# Patient Record
Sex: MALE | Race: WHITE | NOT HISPANIC OR LATINO | Employment: OTHER | URBAN - METROPOLITAN AREA
[De-identification: names, ages, dates, MRNs, and addresses within clinical notes are randomized per-mention and may not be internally consistent; named-entity substitution may affect disease eponyms.]

---

## 2020-08-04 ENCOUNTER — OFFICE VISIT (OUTPATIENT)
Dept: PODIATRY | Facility: CLINIC | Age: 55
End: 2020-08-04

## 2020-08-04 VITALS
HEIGHT: 74 IN | WEIGHT: 241 LBS | DIASTOLIC BLOOD PRESSURE: 92 MMHG | BODY MASS INDEX: 30.93 KG/M2 | HEART RATE: 80 BPM | SYSTOLIC BLOOD PRESSURE: 142 MMHG

## 2020-08-04 DIAGNOSIS — M79.672 LEFT FOOT PAIN: Primary | ICD-10-CM

## 2020-08-04 DIAGNOSIS — M77.42 METATARSALGIA, LEFT FOOT: ICD-10-CM

## 2020-08-04 DIAGNOSIS — M72.2 PLANTAR FASCIAL FIBROMATOSIS OF LEFT FOOT: ICD-10-CM

## 2020-08-04 PROCEDURE — 99203 OFFICE O/P NEW LOW 30 MIN: CPT | Performed by: PODIATRIST

## 2020-08-04 RX ORDER — ASPIRIN 81 MG/1
TABLET, CHEWABLE ORAL
COMMUNITY
Start: 2018-05-16

## 2020-08-04 RX ORDER — B-COMPLEX WITH VITAMIN C
TABLET ORAL
COMMUNITY

## 2020-08-04 RX ORDER — ERGOCALCIFEROL (VITAMIN D2) 10 MCG
400 TABLET ORAL DAILY
COMMUNITY

## 2020-08-04 RX ORDER — AMINO ACIDS/CHROMIUM
TABLET ORAL
COMMUNITY

## 2020-08-05 NOTE — PROGRESS NOTES
2020  Foot and Ankle Surgery - New Patient   Provider: Dr. Sarbjit Contreras DPM  Location: [facility]    Subjective:  Rose Pardo is a 55 y.o. male.     Chief Complaint   Patient presents with   • Left Foot - Pain       HPI: Patient is a 55-year-old male that presents with 2-month history of left foot pain.  He localizes the majority of the discomfort to the plantar aspect of the arch and forefoot.  Symptoms are worse with increased activity and typically improved with rest.  He states that symptoms are an 8 out of 10 when noticed.  He denies any injury.  He has had similar but milder issues in the past which did improve with different shoes.  He has been recently diagnosed with Dupuytren's contracture of his hand.  He has noticed a nodule to the plantar aspect of his left foot that he feels is a plantar fibroma.  No other issues at this time.    No Known Allergies    History reviewed. No pertinent past medical history.    Past Surgical History:   Procedure Laterality Date   • WRIST SURGERY Left        Family History   Problem Relation Age of Onset   • Diabetes Mother    • Heart disease Mother    • Heart disease Father    • Hypertension Father    • Hypertension Sister    • Hypertension Brother        Social History     Socioeconomic History   • Marital status: Single     Spouse name: Not on file   • Number of children: Not on file   • Years of education: Not on file   • Highest education level: Not on file   Tobacco Use   • Smoking status: Former Smoker     Last attempt to quit: 2012     Years since quittin.6   • Smokeless tobacco: Never Used   Substance and Sexual Activity   • Alcohol use: Not Currently   • Drug use: Defer   • Sexual activity: Defer        Current Outpatient Medications on File Prior to Visit   Medication Sig Dispense Refill   • aspirin 81 MG chewable tablet Chew.     • Chromium 1000 MCG tablet Take  by mouth.     • Vitamin D, Cholecalciferol, (CHOLECALCIFEROL) 10 MCG (400  "UNIT) tablet Take 400 Units by mouth Daily.     • Zinc 100 MG tablet Take  by mouth.       No current facility-administered medications on file prior to visit.        Review of Systems:  General: Denies fever, chills, fatigue, and weakness.  Eyes: Denies vision loss, blurry vision, and excessive redness.  ENT: Denies hearing issues and difficulty swallowing.  Cardiovascular: Denies palpitations, chest pain, or syncopal episodes.  Respiratory: Denies shortness of breath, wheezing, and coughing.  GI: Denies abdominal pain, nausea, and vomiting.   : Denies frequency, hematuria, and urgency.  Musculoskeletal: + Left foot pain  Derm: Denies rash, open wounds, or suspicious lesions.  Neuro: Denies headaches, numbness, loss of coordination, and tremors.  Psych: Denies anxiety and depression.  Endocrine: Denies temperature intolerance and changes in appetite.  Heme: Denies bleeding disorders or abnormal bruising.     Objective   /92   Pulse 80   Ht 188 cm (74\")   Wt 109 kg (241 lb)   BMI 30.94 kg/m²     Foot/Ankle Exam:       General:   Appearance: appears stated age and healthy    Orientation: AAOx3    Affect: appropriate    Gait: unimpaired      VASCULAR      Left Foot Vascularity   Normal vascular exam    Dorsalis pedis:  2+  Posterior tibial:  2+  Skin Temperature: warm    Edema Grading:  None  CFT:  < 3 seconds  Pedal Hair Growth:  Present  Varicosities: none        NEUROLOGIC     Left Foot Neurologic   Light touch sensation:  Normal  Hot/cold sensation: normal    Achilles reflex:  2+     MUSCULOSKELETAL      Left Foot Musculoskeletal   Ecchymosis:  None  Tenderness: lesser metatarsophalangeal joints, plantar fascia and arch    Arch:  Normal     MUSCLE STRENGTH     Left Foot Muscle Strength   Normal strength    Foot dorsiflexion:  5  Foot plantar flexion:  5  Foot inversion:  5  Foot eversion:  5     DERMATOLOGIC     Left Foot Dermatologic   Skin: skin intact        Left Foot Additional Comments: Moderate " sized nodule noted to the medial band of the plantar fascia at the level of the midfoot.  No gross deformity or instability.  Mild soft tissue rigidity and equinus contracture.      Assessment/Plan   Rose was seen today for pain.    Diagnoses and all orders for this visit:    Left foot pain  -     Cancel: XR Foot 3+ View Left    Plantar fascial fibromatosis of left foot    Metatarsalgia, left foot      Patient presents with issues involving his left foot.  He has noticed pain over the last 2 months to the plantar aspect of the arch and forefoot region.  Clinically, he does have plantar nodule to the medial band of the plantar fascia consistent with a plantar fibroma.  I explained that his symptoms are likely due to lack of support and increased forefoot stress.  We did discuss the correlation between plantar fibroma and Dupuytren's contracture.  I have recommended that he acquire a pair of over-the-counter arch supports with a metatarsal pad for forefoot offloading.  We also reviewed proper stretching and manual therapy exercises.  I would like him to monitor and I will see him in 4 weeks for reevaluation and imaging    No orders of the defined types were placed in this encounter.       Note is dictated utilizing voice recognition software. Unfortunately this leads to occasional typographical errors. I apologize in advance if the situation occurs. If questions occur please do not hesitate to call our office.

## 2020-08-05 NOTE — PATIENT INSTRUCTIONS
Foot Pain  Many things can cause foot pain. Some common causes are:  · An injury.  · A sprain.  · Arthritis.  · Blisters.  · Bunions.  Follow these instructions at home:  Managing pain, stiffness, and swelling  If directed, put ice on the painful area:  · Put ice in a plastic bag.  · Place a towel between your skin and the bag.  · Leave the ice on for 20 minutes, 2-3 times a day.    Activity  · Do not stand or walk for long periods.  · Return to your normal activities as told by your health care provider. Ask your health care provider what activities are safe for you.  · Do stretches to relieve foot pain and stiffness as told by your health care provider.  · Do not lift anything that is heavier than 10 lb (4.5 kg), or the limit that you are told, until your health care provider says that it is safe. Lifting a lot of weight can put added pressure on your feet.  Lifestyle  · Wear comfortable, supportive shoes that fit you well. Do not wear high heels.  · Keep your feet clean and dry.  General instructions  · Take over-the-counter and prescription medicines only as told by your health care provider.  · Rub your foot gently.  · Pay attention to any changes in your symptoms.  · Keep all follow-up visits as told by your health care provider. This is important.  Contact a health care provider if:  · Your pain does not get better after a few days of self-care.  · Your pain gets worse.  · You cannot stand on your foot.  Get help right away if:  · Your foot is numb or tingling.  · Your foot or toes are swollen.  · Your foot or toes turn white or blue.  · You have warmth and redness along your foot.  Summary  · Common causes of foot pain are injury, sprain, arthritis, blisters or bunions.  · Ice, medicines, and comfortable shoes may help foot pain.  · Contact your health care provider if your pain does not get better after a few days of self-care.  This information is not intended to replace advice given to you by your health  care provider. Make sure you discuss any questions you have with your health care provider.  Document Released: 01/13/2017 Document Revised: 10/03/2019 Document Reviewed: 10/03/2019  [employer]vier Patient Education © 2020 [employer]

## 2020-09-01 ENCOUNTER — OFFICE VISIT (OUTPATIENT)
Dept: PODIATRY | Facility: CLINIC | Age: 55
End: 2020-09-01

## 2020-09-01 VITALS
DIASTOLIC BLOOD PRESSURE: 87 MMHG | WEIGHT: 240 LBS | HEIGHT: 74 IN | HEART RATE: 73 BPM | BODY MASS INDEX: 30.8 KG/M2 | SYSTOLIC BLOOD PRESSURE: 137 MMHG

## 2020-09-01 DIAGNOSIS — M72.2 PLANTAR FASCIAL FIBROMATOSIS OF LEFT FOOT: ICD-10-CM

## 2020-09-01 DIAGNOSIS — M79.672 FOOT PAIN, LEFT: Primary | ICD-10-CM

## 2020-09-01 DIAGNOSIS — M77.42 METATARSALGIA, LEFT FOOT: ICD-10-CM

## 2020-09-01 PROCEDURE — 99213 OFFICE O/P EST LOW 20 MIN: CPT | Performed by: PODIATRIST

## 2022-09-12 ENCOUNTER — TELEPHONE (OUTPATIENT)
Dept: PODIATRY | Facility: CLINIC | Age: 57
End: 2022-09-12

## 2022-09-12 NOTE — TELEPHONE ENCOUNTER
Provider: DR MCKEON    Caller: CAM MARIEE    Relationship to Patient: SELF    Phone Number: [phone]    Reason for Call: PT SAID THAT THE INSERTS THAT DR MCKEON HAD PRESCRIBED HIM HAVE DONE VERY WELL SINCE THE LAST TIME THAT HE WAS IN. THEY HAVE UNFORTUNATELY BEEN RUN DOWN AND HE NEEDS TO GET SOME NEW ONES. HE WOULD LIKE TO KNOW HOW HE CAN GO ABOUT GETTING SOME NEW ONES.    When was the patient last seen: SEPT 2020